# Patient Record
(demographics unavailable — no encounter records)

---

## 2025-01-24 NOTE — PHYSICAL EXAM
[Chaperone Present] : A chaperone was present in the examining room during all aspects of the physical examination [83961] : A chaperone was present during the pelvic exam. [FreeTextEntry2] : ma [Appropriately responsive] : appropriately responsive [Alert] : alert [No Acute Distress] : no acute distress [No Lymphadenopathy] : no lymphadenopathy [Soft] : soft [Non-tender] : non-tender [Non-distended] : non-distended [No HSM] : No HSM [No Lesions] : no lesions [No Mass] : no mass [Oriented x3] : oriented x3 [Labia Majora] : normal [Labia Minora] : normal [Normal] : normal [Uterine Adnexae] : normal

## 2025-01-24 NOTE — PLAN
[FreeTextEntry1] : UCG negative [facility].  discussed causes of abnormal uterine bleeding including but not limited to fibroids, polyps, adenomyosis, endometriosis, malignancy, coagulation abnormalities, anovulation, pcos, hormonal dysfunction, weight gain/loss and stress will draw bloodwork including hormonal panel today patient to get pelvic sono and to return to office to discuss results Rx given for Provera. Discussed timing of menses 10 days after Provera use.    RTO for pelvic sono.

## 2025-01-24 NOTE — PHYSICAL EXAM
[Chaperone Present] : A chaperone was present in the examining room during all aspects of the physical examination [[id number]] : A chaperone was present during the pelvic exam. [FreeTextEntry2] : ma [Appropriately responsive] : appropriately responsive [Alert] : alert [No Acute Distress] : no acute distress [No Lymphadenopathy] : no lymphadenopathy [Soft] : soft [Non-tender] : non-tender [Non-distended] : non-distended [No HSM] : No HSM [No Lesions] : no lesions [No Mass] : no mass [Oriented x3] : oriented x3 [Labia Majora] : normal [Labia Minora] : normal [Normal] : normal [Uterine Adnexae] : normal

## 2025-02-07 NOTE — HISTORY OF PRESENT ILLNESS
[FreeTextEntry1] : 35 year old LEXI MARQUIS pt presents for follow up visit for missed menses. S/p Provera use. LMP 11/24/2024. Pt reports spotting following progestin. Reviewed hormonal panel labworks WNL. Pt had US on 2/6 which revealed uterus measuring 7.6 cm, right ovary measuring 2.6 cm and left ovary measuring 2.6 cm. Advised about both labwork and US in detail.

## 2025-02-07 NOTE — SIGNATURES
[TextEntry] : This note was written by Kari Roca on 02/07/2025 actively solely BLAISE Dodson M.D 02/07/2025. All medical record entries made by this scribe were at my  BLAISE Dodson M.D  direction and personally dictated by me on 02/07/2025. I have personally reviewed my chart and agree that the record reflects my personal performance of the history, physical exam, assessment, and plan.

## 2025-02-07 NOTE — PLAN
[FreeTextEntry1] : Discussed options for contraceptive management. This includes the risks, benefits and side effects of each. The patient had an opportunity to have all of her questions answered to her apparent satisfaction.  Rx for Loestrin sent w/ instructions